# Patient Record
Sex: MALE | Race: AMERICAN INDIAN OR ALASKA NATIVE
[De-identification: names, ages, dates, MRNs, and addresses within clinical notes are randomized per-mention and may not be internally consistent; named-entity substitution may affect disease eponyms.]

---

## 2019-06-10 ENCOUNTER — HOSPITAL ENCOUNTER (EMERGENCY)
Dept: HOSPITAL 5 - ED | Age: 59
Discharge: HOME | End: 2019-06-10
Payer: COMMERCIAL

## 2019-06-10 VITALS — DIASTOLIC BLOOD PRESSURE: 72 MMHG | SYSTOLIC BLOOD PRESSURE: 125 MMHG
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
## 2019-06-10 DIAGNOSIS — S16.1XXA: Primary | ICD-10-CM
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
## 2019-06-10 DIAGNOSIS — S39.012A: ICD-10-CM
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
## 2019-06-10 PROCEDURE — 72040 X-RAY EXAM NECK SPINE 2-3 VW: CPT

## 2019-06-10 PROCEDURE — 99283 EMERGENCY DEPT VISIT LOW MDM: CPT

## 2019-06-10 PROCEDURE — 72100 X-RAY EXAM L-S SPINE 2/3 VWS: CPT

## 2019-06-10 NOTE — XRAY REPORT
CERVICAL SPINE, 3 views:



History: Neck pain.



Findings: The vertebral bodies, disk spaces, posterior elements and 

prevertebral soft tissues are intact. The dens is intact. No acute 

fracture or malalignment is identified. Moderate degenerative disc 

disease is noted at C5-6 and C6-7.



Impression:

Cervical spondylosis. No evidence for acute injury to the cervical 

spine.
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
## 2019-06-10 NOTE — XRAY REPORT
RIGHT SHOULDER, 3 VIEWS:



HISTORY: right shoulder pain.



Normal bone mineralization.  No acute osseous injury or joint pathology 

is detected.  The soft tissues are unremarkable.



IMPRESSION:

Right shoulder within normal limits.
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
## 2019-06-10 NOTE — XRAY REPORT
LUMBOSACRAL SPINE, 3 VIEWS:



History: Back pain



Findings: The vertebral bodies, disk spaces and posterior elements are 

intact.  No compression deformity or malalignment.  The SI joints are 

symmetric with mild bilateral osteoarthritic changes. Moderate 

degenerative disc disease and facet arthropathy are identified at L3-4, 

L4-5 and L5-S1.



Impression:

Lumbar spondylosis. No evidence for acute injury to the lumbar spine.